# Patient Record
Sex: FEMALE | Race: BLACK OR AFRICAN AMERICAN
[De-identification: names, ages, dates, MRNs, and addresses within clinical notes are randomized per-mention and may not be internally consistent; named-entity substitution may affect disease eponyms.]

---

## 2022-05-25 ENCOUNTER — P2P PATIENT RECORD (OUTPATIENT)
Age: 22
End: 2022-05-25

## 2022-06-14 ENCOUNTER — LAB OUTSIDE AN ENCOUNTER (OUTPATIENT)
Dept: URBAN - NONMETROPOLITAN AREA CLINIC 2 | Facility: CLINIC | Age: 22
End: 2022-06-14

## 2022-06-14 ENCOUNTER — WEB ENCOUNTER (OUTPATIENT)
Dept: URBAN - NONMETROPOLITAN AREA CLINIC 2 | Facility: CLINIC | Age: 22
End: 2022-06-14

## 2022-06-14 ENCOUNTER — OFFICE VISIT (OUTPATIENT)
Dept: URBAN - NONMETROPOLITAN AREA CLINIC 2 | Facility: CLINIC | Age: 22
End: 2022-06-14
Payer: COMMERCIAL

## 2022-06-14 VITALS
HEART RATE: 91 BPM | HEIGHT: 61 IN | DIASTOLIC BLOOD PRESSURE: 88 MMHG | BODY MASS INDEX: 33.08 KG/M2 | SYSTOLIC BLOOD PRESSURE: 134 MMHG | WEIGHT: 175.2 LBS

## 2022-06-14 DIAGNOSIS — K59.00 CONSTIPATION, UNSPECIFIED CONSTIPATION TYPE: ICD-10-CM

## 2022-06-14 DIAGNOSIS — Q93.82 WILLIAMS SYNDROME: ICD-10-CM

## 2022-06-14 DIAGNOSIS — R11.2 NAUSEA AND VOMITING, UNSPECIFIED VOMITING TYPE: ICD-10-CM

## 2022-06-14 PROCEDURE — 99204 OFFICE O/P NEW MOD 45 MIN: CPT | Performed by: INTERNAL MEDICINE

## 2022-06-14 NOTE — HPI-TODAY'S VISIT:
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  Ms. Hahn presents with her mother to today's visit.  Over the past 3 weeks, has experienced nausea/vomiting and abdominal bloating. She notes upper abdominal discomfort. Food can exacerbate the symptoms particularly greasy foods. She does experience constipation. Can have one bowel movement every other day. Does not feel as thoug hshe completely evacuates her bowels. When passes a bowel movement she does feel better.   Labs in May 2022 showed normal CBC, chemsitry panel, LFTs.   Denies family history of colon p[olyps, colon cancer, inflammatory bowel disease.   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

## 2022-06-25 ENCOUNTER — DASHBOARD ENCOUNTERS (OUTPATIENT)
Age: 22
End: 2022-06-25

## 2022-06-25 PROBLEM — 16932000: Status: ACTIVE | Noted: 2022-06-14

## 2022-07-14 ENCOUNTER — CLAIMS CREATED FROM THE CLAIM WINDOW (OUTPATIENT)
Dept: URBAN - METROPOLITAN AREA CLINIC 4 | Facility: CLINIC | Age: 22
End: 2022-07-14
Payer: COMMERCIAL

## 2022-07-14 ENCOUNTER — OFFICE VISIT (OUTPATIENT)
Dept: URBAN - NONMETROPOLITAN AREA SURGERY CENTER 1 | Facility: SURGERY CENTER | Age: 22
End: 2022-07-14
Payer: COMMERCIAL

## 2022-07-14 DIAGNOSIS — K31.89 OTHER DISEASES OF STOMACH AND DUODENUM: ICD-10-CM

## 2022-07-14 DIAGNOSIS — K21.9 GASTRO-ESOPHAGEAL REFLUX DISEASE WITHOUT ESOPHAGITIS: ICD-10-CM

## 2022-07-14 DIAGNOSIS — K21.9 ACID REFLUX: ICD-10-CM

## 2022-07-14 PROCEDURE — G8907 PT DOC NO EVENTS ON DISCHARG: HCPCS | Performed by: INTERNAL MEDICINE

## 2022-07-14 PROCEDURE — 43239 EGD BIOPSY SINGLE/MULTIPLE: CPT | Performed by: INTERNAL MEDICINE

## 2022-07-14 PROCEDURE — 88312 SPECIAL STAINS GROUP 1: CPT | Performed by: PATHOLOGY

## 2022-07-14 PROCEDURE — 88305 TISSUE EXAM BY PATHOLOGIST: CPT | Performed by: PATHOLOGY

## 2022-09-19 ENCOUNTER — OFFICE VISIT (OUTPATIENT)
Dept: URBAN - NONMETROPOLITAN AREA CLINIC 2 | Facility: CLINIC | Age: 22
End: 2022-09-19

## 2022-09-19 PROBLEM — 63247009: Status: ACTIVE | Noted: 2022-06-25

## 2022-09-19 PROBLEM — 14760008: Status: ACTIVE | Noted: 2022-06-14

## 2022-09-19 NOTE — HPI-TODAY'S VISIT:
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  Ms. Hahn presents with her mother to today's visit.  Over the past 3 weeks, has experienced nausea/vomiting and abdominal bloating. She notes upper abdominal discomfort. Food can exacerbate the symptoms particularly greasy foods. She does experience constipation. Can have one bowel movement every other day. Does not feel as though she completely evacuates her bowels. When passes a bowel movement she does feel better.   Labs in May 2022 showed normal CBC, chemsitry panel, LFTs.   Denies family history of colon polyps, colon cancer, inflammatory bowel disease.   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  7/14/2022: EGD The examined esophagus was normal. Proximal and distal esophgeal biopsies obtained. Erythematous mucosa without bleeding was found in the gastric body and gastric antrum. Biopsied. Examined duodenum was normal. Biopsied.  7/14/2022: Pathology from EGD Duodenum Biopsy: No significant abnormality. Stomach Biopsy: No significant abnormality. Distal Esophagus Biopsies: Squamous mucosa with reflux-type changes. Proximal Esophagus Biopsies: Squamous mucosa with reflux-type changes.   Plan: -PPI. -Anti-reflux diet. -Treat constipation. -Ultrasound.